# Patient Record
Sex: MALE | Race: WHITE | NOT HISPANIC OR LATINO | URBAN - METROPOLITAN AREA
[De-identification: names, ages, dates, MRNs, and addresses within clinical notes are randomized per-mention and may not be internally consistent; named-entity substitution may affect disease eponyms.]

---

## 2024-04-20 ENCOUNTER — HOSPITAL ENCOUNTER (OUTPATIENT)
Facility: HOSPITAL | Age: 29
Discharge: HOME | End: 2024-04-21
Attending: EMERGENCY MEDICINE | Admitting: STUDENT IN AN ORGANIZED HEALTH CARE EDUCATION/TRAINING PROGRAM
Payer: COMMERCIAL

## 2024-04-20 DIAGNOSIS — L03.211 FACIAL CELLULITIS: Primary | ICD-10-CM

## 2024-04-20 PROCEDURE — 99285 EMERGENCY DEPT VISIT HI MDM: CPT

## 2024-04-20 PROCEDURE — 10060 I&D ABSCESS SIMPLE/SINGLE: CPT

## 2024-04-20 RX ORDER — LIDOCAINE HYDROCHLORIDE 20 MG/ML
10 INJECTION, SOLUTION INFILTRATION; PERINEURAL
Status: COMPLETED | OUTPATIENT
Start: 2024-04-20 | End: 2024-04-21

## 2024-04-20 NOTE — LETTER
April 21, 2024          No Recipients                [address]  [facility] [address]  Phone: [phone]  Fax: [phone]   Patient: Pastor Valdez   MR Number: [id number]   YOB: 1995   Date of Visit: 4/20/2024       Dear Dr. Bucio Recipients:    Thank you for referring Pastor Valdez to me for evaluation. Below are the relevant portions of my assessment and plan of care.            If you have questions, please do not hesitate to call me. I look forward to following Pastor along with you.    Sincerely,      Jason Kathleen,            CC    No Recipients

## 2024-04-20 NOTE — LETTER
April 21, 2024         [address]  [facility] [address]  Phone: [phone]  Fax: [phone]       Patient: Pastor Valdez   YOB: 1995  Date of Visit: 04/21/2024    To Whom It May Concern:    Kathy Valdez  was at [facility] on 04/21/2024. The patient may return to work on 04/22/2024 with restrictions. He should not have any skin to skin contact with other people for the next 1 week. If you have any questions or concerns, or if I can be of further assistance, please do not hesitate to contact me.    Sincerely,    Jason Kathleen, DO

## 2024-04-21 VITALS
TEMPERATURE: 98 F | RESPIRATION RATE: 18 BRPM | SYSTOLIC BLOOD PRESSURE: 138 MMHG | BODY MASS INDEX: 30.52 KG/M2 | WEIGHT: 218 LBS | HEIGHT: 71 IN | HEART RATE: 82 BPM | DIASTOLIC BLOOD PRESSURE: 73 MMHG | OXYGEN SATURATION: 99 %

## 2024-04-21 PROBLEM — F90.9 ADHD: Status: ACTIVE | Noted: 2024-04-21

## 2024-04-21 PROBLEM — F41.9 ANXIETY: Status: ACTIVE | Noted: 2024-04-21

## 2024-04-21 PROBLEM — L03.211 FACIAL CELLULITIS: Status: ACTIVE | Noted: 2024-04-21

## 2024-04-21 LAB
ALBUMIN SERPL BCP-MCNC: 4 G/DL (ref 3.5–5.2)
ALP SERPL-CCNC: 107 U/L (ref 55–135)
ALT SERPL W/O P-5'-P-CCNC: 31 U/L (ref 10–44)
ANION GAP SERPL CALC-SCNC: 10 MMOL/L (ref 8–16)
AST SERPL-CCNC: 35 U/L (ref 10–40)
BASOPHILS # BLD AUTO: 0.01 K/UL (ref 0–0.2)
BASOPHILS NFR BLD: 0.1 % (ref 0–1.9)
BILIRUB SERPL-MCNC: 1.8 MG/DL (ref 0.1–1)
BUN SERPL-MCNC: 11 MG/DL (ref 6–20)
CALCIUM SERPL-MCNC: 9.1 MG/DL (ref 8.7–10.5)
CHLORIDE SERPL-SCNC: 101 MMOL/L (ref 95–110)
CO2 SERPL-SCNC: 24 MMOL/L (ref 23–29)
CREAT SERPL-MCNC: 0.8 MG/DL (ref 0.5–1.4)
CRP SERPL-MCNC: 66 MG/L (ref 0–8.2)
DIFFERENTIAL METHOD BLD: ABNORMAL
EOSINOPHIL # BLD AUTO: 0 K/UL (ref 0–0.5)
EOSINOPHIL NFR BLD: 0.3 % (ref 0–8)
ERYTHROCYTE [DISTWIDTH] IN BLOOD BY AUTOMATED COUNT: 11.6 % (ref 11.5–14.5)
ERYTHROCYTE [SEDIMENTATION RATE] IN BLOOD BY PHOTOMETRIC METHOD: 4 MM/HR (ref 0–23)
EST. GFR  (NO RACE VARIABLE): >60 ML/MIN/1.73 M^2
GLUCOSE SERPL-MCNC: 82 MG/DL (ref 70–110)
HCT VFR BLD AUTO: 39.5 % (ref 40–54)
HGB BLD-MCNC: 13.7 G/DL (ref 14–18)
IMM GRANULOCYTES # BLD AUTO: 0.01 K/UL (ref 0–0.04)
IMM GRANULOCYTES NFR BLD AUTO: 0.1 % (ref 0–0.5)
LACTATE SERPL-SCNC: 0.7 MMOL/L (ref 0.5–2.2)
LYMPHOCYTES # BLD AUTO: 2 K/UL (ref 1–4.8)
LYMPHOCYTES NFR BLD: 25.6 % (ref 18–48)
MCH RBC QN AUTO: 33.6 PG (ref 27–31)
MCHC RBC AUTO-ENTMCNC: 34.7 G/DL (ref 32–36)
MCV RBC AUTO: 97 FL (ref 82–98)
MONOCYTES # BLD AUTO: 1.3 K/UL (ref 0.3–1)
MONOCYTES NFR BLD: 16.8 % (ref 4–15)
NEUTROPHILS # BLD AUTO: 4.5 K/UL (ref 1.8–7.7)
NEUTROPHILS NFR BLD: 57.1 % (ref 38–73)
NRBC BLD-RTO: 0 /100 WBC
PLATELET # BLD AUTO: 214 K/UL (ref 150–450)
PMV BLD AUTO: 9.5 FL (ref 9.2–12.9)
POTASSIUM SERPL-SCNC: 3.7 MMOL/L (ref 3.5–5.1)
PROT SERPL-MCNC: 7.2 G/DL (ref 6–8.4)
RBC # BLD AUTO: 4.08 M/UL (ref 4.6–6.2)
SODIUM SERPL-SCNC: 135 MMOL/L (ref 136–145)
WBC # BLD AUTO: 7.96 K/UL (ref 3.9–12.7)

## 2024-04-21 PROCEDURE — G0378 HOSPITAL OBSERVATION PER HR: HCPCS

## 2024-04-21 PROCEDURE — 80053 COMPREHEN METABOLIC PANEL: CPT | Performed by: PHYSICIAN ASSISTANT

## 2024-04-21 PROCEDURE — 96366 THER/PROPH/DIAG IV INF ADDON: CPT | Mod: 59

## 2024-04-21 PROCEDURE — 10060 I&D ABSCESS SIMPLE/SINGLE: CPT

## 2024-04-21 PROCEDURE — 83605 ASSAY OF LACTIC ACID: CPT | Performed by: PHYSICIAN ASSISTANT

## 2024-04-21 PROCEDURE — 25000003 PHARM REV CODE 250: Performed by: PHYSICIAN ASSISTANT

## 2024-04-21 PROCEDURE — 87040 BLOOD CULTURE FOR BACTERIA: CPT | Mod: 59 | Performed by: PHYSICIAN ASSISTANT

## 2024-04-21 PROCEDURE — 86140 C-REACTIVE PROTEIN: CPT | Performed by: PHYSICIAN ASSISTANT

## 2024-04-21 PROCEDURE — 96367 TX/PROPH/DG ADDL SEQ IV INF: CPT | Mod: 59

## 2024-04-21 PROCEDURE — 85025 COMPLETE CBC W/AUTO DIFF WBC: CPT | Performed by: PHYSICIAN ASSISTANT

## 2024-04-21 PROCEDURE — 96365 THER/PROPH/DIAG IV INF INIT: CPT

## 2024-04-21 PROCEDURE — 25500020 PHARM REV CODE 255: Performed by: EMERGENCY MEDICINE

## 2024-04-21 PROCEDURE — 85652 RBC SED RATE AUTOMATED: CPT | Performed by: PHYSICIAN ASSISTANT

## 2024-04-21 PROCEDURE — 25000003 PHARM REV CODE 250

## 2024-04-21 PROCEDURE — 63600175 PHARM REV CODE 636 W HCPCS: Performed by: PHYSICIAN ASSISTANT

## 2024-04-21 RX ORDER — DOXYCYCLINE HYCLATE 100 MG
100 TABLET ORAL EVERY 12 HOURS
Status: DISCONTINUED | OUTPATIENT
Start: 2024-04-21 | End: 2024-04-21 | Stop reason: HOSPADM

## 2024-04-21 RX ORDER — KETOROLAC TROMETHAMINE 30 MG/ML
15 INJECTION, SOLUTION INTRAMUSCULAR; INTRAVENOUS ONCE AS NEEDED
Status: DISCONTINUED | OUTPATIENT
Start: 2024-04-21 | End: 2024-04-21 | Stop reason: HOSPADM

## 2024-04-21 RX ORDER — DOXYCYCLINE HYCLATE 100 MG
100 TABLET ORAL EVERY 12 HOURS
Qty: 12 TABLET | Refills: 0 | Status: SHIPPED | OUTPATIENT
Start: 2024-04-21 | End: 2024-04-27

## 2024-04-21 RX ORDER — HYDROXYZINE HYDROCHLORIDE 25 MG/1
25 TABLET, FILM COATED ORAL 3 TIMES DAILY PRN
Status: DISCONTINUED | OUTPATIENT
Start: 2024-04-21 | End: 2024-04-21 | Stop reason: HOSPADM

## 2024-04-21 RX ORDER — AMOXICILLIN 875 MG/1
875 TABLET, FILM COATED ORAL EVERY 12 HOURS
Qty: 12 TABLET | Refills: 0 | Status: SHIPPED | OUTPATIENT
Start: 2024-04-21 | End: 2024-04-27

## 2024-04-21 RX ADMIN — VANCOMYCIN HYDROCHLORIDE 1500 MG: 1.5 INJECTION, POWDER, LYOPHILIZED, FOR SOLUTION INTRAVENOUS at 02:04

## 2024-04-21 RX ADMIN — CEFTRIAXONE 1 G: 1 INJECTION, POWDER, FOR SOLUTION INTRAMUSCULAR; INTRAVENOUS at 01:04

## 2024-04-21 RX ADMIN — LIDOCAINE HYDROCHLORIDE 10 ML: 20 INJECTION, SOLUTION INFILTRATION; PERINEURAL at 12:04

## 2024-04-21 RX ADMIN — DOXYCYCLINE HYCLATE 100 MG: 100 TABLET, COATED ORAL at 08:04

## 2024-04-21 RX ADMIN — IOHEXOL 75 ML: 350 INJECTION, SOLUTION INTRAVENOUS at 02:04

## 2024-04-21 NOTE — ED NOTES
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 Reports he thinks he has an abscess, and this happens to him a lot, but he can't drain this one. Denies fever, intraoral swelling, shortness of breath, chest pain, nausea, recent illness.    Triage note:  Chief Complaint   Patient presents with    Cellulitis     Facial cellulitis. Sent from [facility] for further treatment     Review of patient's allergies indicates:  No Known Allergies  No past medical history on file.        General: Awake and alert:  Face: Swelling to right lower face on the cheek. Area is bandaged after IKE Méndez performed I&D. Bandage is clean and not soaked through. No trismus or drooling.  Neck: Supple. No stridor.   Respiratory: Nonlabored respirations. No audible wheeze. Speaking in full sentences.  Cardiac: Well-perfused. No acrocyanosis.  Abdomen: Supple  Neurological: Moves all extremities symmetrically and equally. Answers questions appropriately.

## 2024-04-21 NOTE — ED PROVIDER NOTES
Encounter Date: 4/20/2024       History     Chief Complaint   Patient presents with    Cellulitis     Facial cellulitis. Sent from [facility] for further treatment     HPI  Review of patient's allergies indicates:  No Known Allergies  No past medical history on file.  No past surgical history on file.  No family history on file.     Review of Systems    Physical Exam     Initial Vitals [04/20/24 2152]   BP Pulse Resp Temp SpO2   (!) 146/91 101 15 98.3 °F (36.8 °C) 99 %      MAP       --         Physical Exam    ED Course   Procedures  Labs Reviewed   CBC W/ AUTO DIFFERENTIAL - Abnormal; Notable for the following components:       Result Value    RBC 4.08 (*)     Hemoglobin 13.7 (*)     Hematocrit 39.5 (*)     MCH 33.6 (*)     Mono # 1.3 (*)     Mono % 16.8 (*)     All other components within normal limits   COMPREHENSIVE METABOLIC PANEL - Abnormal; Notable for the following components:    Sodium 135 (*)     Total Bilirubin 1.8 (*)     All other components within normal limits   C-REACTIVE PROTEIN - Abnormal; Notable for the following components:    CRP 66.0 (*)     All other components within normal limits   CULTURE, BLOOD   CULTURE, BLOOD   SEDIMENTATION RATE   LACTIC ACID, PLASMA   ISTAT CHEM8          Imaging Results               CT Maxillofacial With Contrast (Final result)  Result time 04/21/24 03:09:47      Final result by Guido Choudhary MD (04/21/24 03:09:47)                   Impression:      Soft tissue swelling overlying the right maxillary and mandibular face without focal fluid collection to suggest abscess formation.    A small overlying skin wound is suggested in the right facial cheek.    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  No elapsed side imaging to compare findings.  Can consider further characterization with MRI brain if clinically warranted.    This report was flagged in  Epic as abnormal.    Electronically signed by resident: Sharyn Mccracken  Date:    04/21/2024  Time:    02:52    Electronically signed by: Guido Choudhary  Date:    04/21/2024  Time:    03:09               Narrative:    EXAMINATION:  CT MAXILLOFACIAL WITH CONTRAST    CLINICAL HISTORY:  Maxillary/facial abscess;R maxillary abscess/cellulitis;    TECHNIQUE:  Low dose axial CT images obtained throughout the face after administration of 75 cc Omnipaque 350 intravenous contrast. Sagittal and coronal reconstructions were performed.    COMPARISON:  No relevant priors.    FINDINGS:  Focal contour irregularity in the right facial skin surface, possibly representing a small wound (series 2 image 154).    Underlying soft tissue swelling and stranding overlying the right maxillary and mandibular regions.  Mild asymmetric enlargement of the right buccinator muscle.  No focal fluid collections to suggest abscess formation.  No underlying osseous destructive process is apparent by CT at this time.    The visualized osseous structures are intact without evidence fracture or destructive lesion.    Parotid and submandibular glands are symmetric without focal abnormality.    Orbits are symmetric.  No intraconal fat infiltration to suggest postseptal involvement.    Temporomandibular joints appear appropriately positioned.    Visualized paranasal sinuses and mastoid air cells are clear.    Limited intracranial evaluation demonstrates areas of encephalomalacia involving the right frontal and temporal lobes, compatible with sequelae of the reported history of prior brain contusions.  Mild ex vacuo dilatation of the right lateral ventricle.                                       Medications   vancomycin 1,500 mg in dextrose 5 % (D5W) 250 mL IVPB (Vial-Mate) (1,500 mg Intravenous New Bag 4/21/24 0215)   ketorolac injection 15 mg (has no administration in time range)   LIDOcaine HCL 20 mg/ml (2%) injection 10 mL (10 mLs Infiltration Given by  Provider 4/21/24 0000)   cefTRIAXone (Rocephin) 1 g in dextrose 5 % in water (D5W) 100 mL IVPB (MB+) (0 g Intravenous Stopped 4/21/24 0155)   iohexoL (OMNIPAQUE 350) injection 75 mL (75 mLs Intravenous Given 4/21/24 0205)     Medical Decision Making  Amount and/or Complexity of Data Reviewed  Labs: ordered.  Radiology: ordered.    Risk  Prescription drug management.  Decision regarding hospitalization.              Attending Attestation:     Physician Attestation Statement for NP/PA:   I personally made/approved the management plan and take responsibility for the patient management.              ED Course as of 04/21/24 0340   Sun Apr 21, 2024   0023 Small I&D performed over R maxillary lesion with scant amount of serosanguinous discharge. Aerobic culture obtained. Procedure note above.  [BA]   0059 Patient signed out to my colleague at the end of my shift pending labs and CT results. Anticipate hospitalization for ongoing ABx. Patient expresses understanding and agreeable to the plan. I have discussed the care of this patient with my supervising physician.  [BA]      ED Course User Index  [BA] Dev Samaniego, MATEO                           Clinical Impression:  Final diagnoses:  [L03.211] Facial cellulitis (Primary)          ED Disposition Condition    Admit Stable                Jessy Villatoro MD  04/21/24 0340

## 2024-04-21 NOTE — ED PROVIDER NOTES
"Encounter Date: 4/20/2024       History     Chief Complaint   Patient presents with    Cellulitis     Facial cellulitis. Sent from [facility] for further treatment     The history is provided by the patient and medical records. No  was used.       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     Reports 2 days of R facial swelling and redness. Noticed a pimple to the region that he squeezed discharge out. Awoke today with significant swelling. Reports history of recurrent skin infections. Reports having abscesses that he drains himself with a razor blade. Contributes skin infections to "bad skin" and work as a  in hot environments. Denies recreational drug use. Denies dental pain, difficulty swallowing, oral swelling, neck pain. Denies eye pain. Feels like the bottom right visual field is fuzzy. He was initially seen at [facility] and referred to [facility] for further evaluation.     Review of patient's allergies indicates:  No Known Allergies  No past medical history on file.  No past surgical history on file.  No family history on file.     Review of Systems   HENT:  Positive for facial swelling.        Physical Exam     Initial Vitals [04/20/24 2152]   BP Pulse Resp Temp SpO2   (!) 146/91 101 15 98.3 °F (36.8 °C) 99 %      MAP       --         Physical Exam    Constitutional: He appears well-developed and well-nourished. He is not diaphoretic. No distress.   HENT:   Head: Normocephalic and atraumatic.   Mouth/Throat: Oropharynx is clear and moist.   R maxilla swelling and erythema with an overlying lesion. Expressible clear discharge. No gingival or tongue swelling. No septal hematoma. No pain with EOM.    Eyes: EOM are normal. Pupils are equal, round, and reactive to light.   Neck:   Normal range of motion.  Cardiovascular:  Normal rate and regular rhythm.         "   Pulmonary/Chest: No respiratory distress.   Musculoskeletal:         General: Normal range of motion.      Cervical back: Normal range of motion.     Neurological: He is alert and oriented to person, place, and time.   Skin: Skin is warm and dry. No rash noted.     R face:    ED Course   I & D - Incision and Drainage    Date/Time: 4/21/2024 12:22 AM  Location procedure was performed: [facility] EMERGENCY DEPARTMENT    Performed by: Dev Samaniego PA-C  Authorized by: Jessy Villatoro MD  Consent Done: Emergent Situation  Type: abscess  Body area: head/neck  Location details: face  Anesthesia: local infiltration    Anesthesia:  Local Anesthetic: lidocaine 2% without epinephrine  Scalpel size: 11  Incision type: single straight  Incision depth: subcutaneous  Complexity: simple  Drainage: serosanguinous  Drainage amount: scant  Packing material: none  Complications: No  Estimated blood loss (mL): 5  Specimens: Yes (aerobic)  Implants: No  Patient tolerance: Patient tolerated the procedure well with no immediate complications    Incision depth: subcutaneous        Labs Reviewed   CULTURE, BLOOD   CULTURE, BLOOD   CBC W/ AUTO DIFFERENTIAL   COMPREHENSIVE METABOLIC PANEL   SEDIMENTATION RATE   C-REACTIVE PROTEIN   LACTIC ACID, PLASMA   ISTAT CHEM8          Imaging Results    None          Medications   vancomycin 1,500 mg in dextrose 5 % (D5W) 250 mL IVPB (Vial-Mate) (has no administration in time range)   cefTRIAXone (Rocephin) 1 g in dextrose 5 % in water (D5W) 100 mL IVPB (MB+) (has no administration in time range)   ketorolac injection 15 mg (has no administration in time range)   LIDOcaine HCL 20 mg/ml (2%) injection 10 mL (10 mLs Infiltration Given by Provider 4/21/24 0000)     Medical Decision Making  28 y.o. male with medical history of ADHD presenting to the ED c/o 2 days of R facial swelling and redness.     DDx includes but not limited to cellulitis, abscess, shingles, HSV, sinusitis. No pain with EOM and do  not suspect orbital cellulitis. No dental pain or gingival swelling and lower suspicion for dental abscess.     Amount and/or Complexity of Data Reviewed  Labs: ordered. Decision-making details documented in ED Course.  Radiology: ordered and independent interpretation performed.    Risk  Prescription drug management.  Decision regarding hospitalization.              Attending Attestation:     Physician Attestation Statement for NP/PA:   I personally made/approved the management plan and take responsibility for the patient management.              ED Course as of 04/21/24 0105   Sun Apr 21, 2024   0023 Small I&D performed over R maxillary lesion with scant amount of serosanguinous discharge. Aerobic culture obtained. Procedure note above.  [BA]   0059 Patient signed out to my colleague at the end of my shift pending labs and CT results. Anticipate hospitalization for ongoing ABx. Patient expresses understanding and agreeable to the plan. I have discussed the care of this patient with my supervising physician.  [BA]      ED Course User Index  [BA] Dev Samaniego PA-C                           Clinical Impression:  Final diagnoses:  [L03.211] Facial cellulitis (Primary)          ED Disposition Condition    Admit Stable                Dev Samaniego PA-C  04/21/24 0105       Jessy Villatoro MD  04/21/24 5914

## 2024-04-21 NOTE — H&P
[facility] - Emergency Dept  Hospital Medicine  History & Physical    Patient Name: Pastor Valdez  MRN: [id number]  Patient Class: OP- Observation  Admission Date: 4/20/2024  Attending Physician: Nate Harvey MD   Primary Care Provider: No primary care provider on file.         Patient information was obtained from patient and ER records.     Subjective:     Principal Problem:Facial cellulitis    Chief Complaint:   Chief Complaint   Patient presents with    Cellulitis     Facial cellulitis. Sent from [facility] for further treatment        HPI: 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 Started out as a pimple that he popped and swelling acutely worsened the morning of 4/20. He has a prolonged history of recurrent skin abscesses that he drains himself with a razor. He works as a  in hot environements. Patient denies dental pain, drooling, difficulty eating, or fevers or blurry vision.    In ED, patient afebrile and HDS. I&D performed with scant serosanguinous discharge, aerobic cultures pending. Labs significant for CRP 66. CT maxillofacial with soft tissue swelling overlying the right maxillary and mandibular face without focal fluid collection to suggest abscess formation. Also noted to have right temporal and frontal encephalomalacia from reported history of prior contusions with ex vacuo dilatation of the right lateral ventricles.  Admitted to  for further management.     No past medical history on file.    No past surgical history on file.    Review of patient's allergies indicates:  No Known Allergies    Current Facility-Administered Medications   Medication Dose Route Frequency Provider Last Rate Last Admin    hydrOXYzine HCL tablet 25 mg  25 mg Oral TID PRN Emmy Olsen MD        ketorolac injection 15 mg  15 mg Intravenous Once PRN Dev Samaniego PA-C         No current outpatient medications on  file.     Family History    None       Tobacco Use    Smoking status: Not on file    Smokeless tobacco: Not on file   Substance and Sexual Activity    Alcohol use: Not on file    Drug use: Not on file    Sexual activity: Not on file     Review of Systems   Constitutional:  Negative for activity change, chills and fatigue.   HENT:  Positive for facial swelling. Negative for congestion, dental problem, drooling, postnasal drip, sinus pressure, sneezing, sore throat, trouble swallowing and voice change.    Respiratory:  Negative for cough, chest tightness, shortness of breath and wheezing.    Cardiovascular:  Negative for chest pain, palpitations and leg swelling.   Gastrointestinal:  Negative for abdominal pain, constipation, diarrhea, nausea and vomiting.   Genitourinary:  Negative for difficulty urinating, dysuria and flank pain.   Neurological:  Negative for tremors, syncope, speech difficulty, weakness, light-headedness and headaches.   Hematological:  Does not bruise/bleed easily.   Psychiatric/Behavioral:  Negative for agitation, behavioral problems and confusion.      Objective:     Vital Signs (Most Recent):  Temp: 98.3 °F (36.8 °C) (04/20/24 2152)  Pulse: 101 (04/20/24 2152)  Resp: 15 (04/20/24 2152)  BP: (!) 146/91 (04/20/24 2152)  SpO2: 99 % (04/20/24 2152) Vital Signs (24h Range):  Temp:  [98.3 °F (36.8 °C)] 98.3 °F (36.8 °C)  Pulse:  [101] 101  Resp:  [15] 15  SpO2:  [99 %] 99 %  BP: (146)/(91) 146/91     Weight: 98.9 kg (218 lb)  Body mass index is 30.4 kg/m².     Physical Exam  Vitals reviewed.   Constitutional:       General: He is not in acute distress.     Appearance: He is not ill-appearing.   HENT:      Head: Normocephalic.      Comments: Right side facial swelling     Nose: Nose normal.      Mouth/Throat:      Mouth: Mucous membranes are moist.      Pharynx: No oropharyngeal exudate.   Eyes:      General: No scleral icterus.     Pupils: Pupils are equal, round, and reactive to light.    Cardiovascular:      Rate and Rhythm: Normal rate and regular rhythm.      Pulses: Normal pulses.      Heart sounds: No murmur heard.  Pulmonary:      Effort: Pulmonary effort is normal. No respiratory distress.      Breath sounds: No wheezing or rales.   Abdominal:      General: Abdomen is flat. Bowel sounds are normal. There is no distension.      Tenderness: There is no abdominal tenderness.   Musculoskeletal:         General: Normal range of motion.      Right lower leg: No edema.      Left lower leg: No edema.   Skin:     General: Skin is warm.      Capillary Refill: Capillary refill takes less than 2 seconds.      Coloration: Skin is not jaundiced.   Neurological:      General: No focal deficit present.      Mental Status: He is alert and oriented to person, place, and time.   Psychiatric:      Comments: Anxious              CRANIAL NERVES     CN III, IV, VI   Pupils are equal, round, and reactive to light.       Significant Labs: All pertinent labs within the past 24 hours have been reviewed.    Significant Imaging: I have reviewed all pertinent imaging results/findings within the past 24 hours.  Assessment/Plan:     * Facial cellulitis  28M presenting for 2 day history of right facial swelling and erythema. Started out as a pimple that he popped and swelling acutely worsened the morning of 4/20. He has a prolonged history of recurrent skin abscesses that he drains himself with a razor. Patient denies dental pain, drooling, difficulty eating, or fevers or blurry vision. I&D performed with scant serosanguinous discharge, aerobic cultures pending. Labs significant for CRP 66. CT maxillofacial consistent with cellulitis overlying right maxillary and mandibular face without evidence of abscess. Received Vanc/CTX in ED.     - F/u cultures  - Will continue CTX and doxycycline, de-escalate when able     Anxiety  Patient unsure home anxiolytic    - Hydroxyzine PRN    ADHD  Takes adderall TID        VTE Risk  Mitigation (From admission, onward)      None                    Emmy Olsen MD  Department of Hospital Medicine  [facility] - Emergency Dept

## 2024-04-21 NOTE — PROVIDER PROGRESS NOTES - EMERGENCY DEPT.
ED Attending Hand-off Note    I have discussed the patient's history and presentation in the ED with Jessy Villatoro MD    Brief H&P: Patient is a 28 y.o. male who presented to the ED with Cellulitis (Facial cellulitis. Sent from [facility] for further treatment)        Pending studies and consultations: Labs, admission    Disposition:  Will continue to monitor, update patient on results of testing and determine appropriate additional treatment for the duration of stay in ED.  Pino Diaz MD 12:56 AM 4/21/2024        UPDATES:   Discussed with Hospital Medicine who place the patient observation        Clinical Impression  :  Facial cellulitis (Primary)       ED Disposition Condition    Admit Stable

## 2024-04-21 NOTE — HPI
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 Started out as a pimple that he popped and swelling acutely worsened the morning of 4/20. He has a prolonged history of recurrent skin abscesses that he drains himself with a razor. He works as a  in hot environements. Patient denies dental pain, drooling, difficulty eating, or fevers or blurry vision.    In ED, patient afebrile and HDS. I&D performed with scant serosanguinous discharge, aerobic cultures pending. Labs significant for CRP 66. CT maxillofacial with soft tissue swelling overlying the right maxillary and mandibular face without focal fluid collection to suggest abscess formation. Also noted to have right temporal and frontal encephalomalacia from reported history of prior contusions with ex vacuo dilatation of the right lateral ventricles.  Admitted to  for further management.

## 2024-04-21 NOTE — ASSESSMENT & PLAN NOTE
28M presenting for 2 day history of right facial swelling and erythema. Started out as a pimple that he popped and swelling acutely worsened the morning of 4/20. He has a prolonged history of recurrent skin abscesses that he drains himself with a razor. Patient denies dental pain, drooling, difficulty eating, or fevers or blurry vision. I&D performed with scant serosanguinous discharge, aerobic cultures pending. Labs significant for CRP 66. CT maxillofacial consistent with cellulitis overlying right maxillary and mandibular face without evidence of abscess. Received Vanc/CTX in ED.     - F/u cultures  - Will continue CTX and doxycycline, de-escalate when able

## 2024-04-24 NOTE — DISCHARGE SUMMARY
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 Started out as a pimple that he popped and swelling acutely worsened the morning of 4/20. He has a prolonged history of recurrent skin abscesses that he drains himself with a razor. He works as a  in hot environements. Patient denies dental pain, drooling, difficulty eating, or fevers or blurry vision. In ED, patient afebrile and HDS. I&D performed with scant serosanguinous discharge, aerobic cultures pending. Labs significant for CRP 66. CT maxillofacial with soft tissue swelling overlying the right maxillary and mandibular face without focal fluid collection to suggest abscess formation. Also noted to have right temporal and frontal encephalomalacia from reported history of prior contusions with ex vacuo dilatation of the right lateral ventricles.  Admitted to  for further management. Patient subsequently improved on IV abx and was transitioned to amoxicillin + doxycycline upon d/c.    Significant Diagnostic Studies:   Labs:   No results for input(s): "WBC", "RBC", "HGB", "HCT", "PLT", "MCV", "MCH", "MCHC", "GRAN", "LYMPH", "MONO", "EOS" in the last 72 hours.   No results for input(s): "GLU", "NA", "K", "CL", "CO2", "BUN", "CREATININE", "CALCIUM", "ANIONGAP", "MG", "PHOS" in the last 72 hours.     Microbiology:   Blood Culture, Routine   Date Value Ref Range Status   04/21/2024 No Growth to date  Preliminary   04/21/2024 No Growth to date  Preliminary   04/21/2024 No Growth to date  Preliminary   04/21/2024 " "No Growth to date  Preliminary     No results found for: "LABURIN"  No results found for: "LABAERO"  No results found for: "LABANAE"    Radiology:   No results found for this or any previous visit.     No results found for this or any previous visit.     No results found for this or any previous visit.     No results found for this or any previous visit.       Cardiac Studies:     Significant Treatments/Procedures:   I&D 4/21    Consults while in Hospital:   None    Discharge Medications:      Discharge Medication List as of 4/21/2024 11:18 AM        START taking these medications    Details   amoxicillin (AMOXIL) 875 MG tablet Take 1 tablet (875 mg total) by mouth every 12 (twelve) hours. for 6 days, Starting Sun 4/21/2024, Until Sat 4/27/2024, Normal      doxycycline (VIBRA-TABS) 100 MG tablet Take 1 tablet (100 mg total) by mouth every 12 (twelve) hours. for 6 days, Starting Sun 4/21/2024, Until Sat 4/27/2024, Normal              Discharge Diet:regular diet with Normal Fluid intake of 1500 - 2000 mL per day    Activity: activity as tolerated    Discharged Condition: Good    Discharge Disposition: Home or Self Care    Follow-up:   F/u PCP as indicated.    Studies/Results pending on discharge:   None    Nate Harvey MD  [facility]   "

## 2024-04-26 LAB
BACTERIA BLD CULT: NORMAL
BACTERIA BLD CULT: NORMAL